# Patient Record
Sex: MALE | Race: WHITE | NOT HISPANIC OR LATINO | URBAN - METROPOLITAN AREA
[De-identification: names, ages, dates, MRNs, and addresses within clinical notes are randomized per-mention and may not be internally consistent; named-entity substitution may affect disease eponyms.]

---

## 2022-07-24 ENCOUNTER — OFFICE VISIT (OUTPATIENT)
Dept: URGENT CARE | Facility: CLINIC | Age: 22
End: 2022-07-24
Payer: COMMERCIAL

## 2022-07-24 VITALS
BODY MASS INDEX: 23.38 KG/M2 | DIASTOLIC BLOOD PRESSURE: 70 MMHG | WEIGHT: 167 LBS | HEART RATE: 76 BPM | OXYGEN SATURATION: 95 % | HEIGHT: 71 IN | TEMPERATURE: 97.2 F | SYSTOLIC BLOOD PRESSURE: 110 MMHG | RESPIRATION RATE: 14 BRPM

## 2022-07-24 DIAGNOSIS — L03.115 CELLULITIS OF RIGHT LOWER EXTREMITY: ICD-10-CM

## 2022-07-24 PROCEDURE — 99203 OFFICE O/P NEW LOW 30 MIN: CPT | Performed by: STUDENT IN AN ORGANIZED HEALTH CARE EDUCATION/TRAINING PROGRAM

## 2022-07-24 RX ORDER — SULFAMETHOXAZOLE AND TRIMETHOPRIM 800; 160 MG/1; MG/1
1 TABLET ORAL 2 TIMES DAILY
Qty: 10 TABLET | Refills: 0 | Status: SHIPPED | OUTPATIENT
Start: 2022-07-24 | End: 2022-07-29

## 2022-07-24 NOTE — PROGRESS NOTES
"Subjective:   CHIEF COMPLAINT  Chief Complaint   Patient presents with   • Wound Infection     (R) ankle wound. X 3 days.        HPI  Anatoliy Fox is a 22 y.o. male who presents with a chief complaint of pain, redness and swelling of his right lateral ankle.  3 days ago the patient was skateboarding and suffered a skin abrasion.  Then over the last 24 hours he developed pain redness and swelling.  Symptoms are aggravated with local palpation and with dorsi flexion.  Pain is localized to the area of the abrasion.  No joint pain.  No systemic symptoms of fevers or chills.    REVIEW OF SYSTEMS  General: no fever or chills  GI: no nausea or vomiting  See HPI for further details.    PAST MEDICAL HISTORY  There are no problems to display for this patient.      SURGICAL HISTORY  patient denies any surgical history    ALLERGIES  No Known Allergies    CURRENT MEDICATIONS  Home Medications     Reviewed by Francisco Montgomery'enzo (Medical Assistant) on 07/24/22 at 1521  Med List Status: <None>   Medication Last Dose Status        Patient Davy Taking any Medications                       SOCIAL HISTORY  Social History     Tobacco Use   • Smoking status: Never Smoker   • Smokeless tobacco: Never Used   Substance and Sexual Activity   • Alcohol use: Yes   • Drug use: Never   • Sexual activity: Not on file       FAMILY HISTORY  No family history on file.       Objective:   PHYSICAL EXAM  VITAL SIGNS: /70   Pulse 76   Temp 36.2 °C (97.2 °F)   Resp 14   Ht 1.803 m (5' 11\")   Wt 75.8 kg (167 lb)   SpO2 95%   BMI 23.29 kg/m²     Gen: no acute distress, normal voice  Skin: dry, intact, moist mucosal membranes  Head: Atraumatic, normocephalic  Psych: normal affect, normal judgement, alert, awake  Musculoskeletal: Right lower extremity: Superficial skin abrasion adjacent to the lateral malleolus with spontaneous purulent discharge through the scab.  Wound surrounded by erythema, edema and tenderness to palpation.  " No fluctuance or induration.  No tenderness to palpation along the anterior joint.  Good joint PROM without any discernible discomfort.      Assessment/Plan:     1. Cellulitis of right lower extremity  sulfamethoxazole-trimethoprim (BACTRIM DS) 800-160 MG tablet    CANCELED: Tdap =>6yo IM   Examination consistent with cellulitis.  Scant purulent discharge protruding from scab.  No drainable abscess. Patient is uncertain when he last received tetanus.  Recommended updating tetanus however our current immunizations are not accessible because the refrigerator was left open overnight.  - Ordered Rx for Bactrim  - Instructed to update his tetanus shot at the pharmacy filling above prescription  - Ibuprofen as needed for symptomatic relief  - Return to urgent care any new/worsening symptoms or further questions or concerns.  Patient understood everything discussed.  All questions were answered.      Differential diagnosis, natural history, supportive care, and indications for immediate follow-up discussed. All questions answered. Patient agrees with the plan of care.    Follow-up as needed if symptoms worsen or fail to improve to PCP, Urgent care or Emergency Room.    Please note that this dictation was created using voice recognition software. I have made a reasonable attempt to correct obvious errors, but I expect that there are errors of grammar and possibly content that I did not discover before finalizing the note.